# Patient Record
Sex: MALE | Race: ASIAN | URBAN - METROPOLITAN AREA
[De-identification: names, ages, dates, MRNs, and addresses within clinical notes are randomized per-mention and may not be internally consistent; named-entity substitution may affect disease eponyms.]

---

## 2018-09-06 ENCOUNTER — HISTORICAL (OUTPATIENT)
Dept: ADMINISTRATIVE | Facility: HOSPITAL | Age: 58
End: 2018-09-06

## 2018-09-11 ENCOUNTER — HISTORICAL (OUTPATIENT)
Dept: SURGERY | Facility: HOSPITAL | Age: 58
End: 2018-09-11

## 2022-04-09 ENCOUNTER — HISTORICAL (OUTPATIENT)
Dept: ADMINISTRATIVE | Facility: HOSPITAL | Age: 62
End: 2022-04-09

## 2022-04-25 VITALS — WEIGHT: 188.5 LBS | BODY MASS INDEX: 37.01 KG/M2 | HEIGHT: 60 IN

## 2022-04-30 NOTE — OP NOTE
Patient:   AMARI PIEDRA            MRN: [id number]            FIN: [id number]               Age:   57 years     Sex:  Male     :  1960   Associated Diagnoses:   None   Author:   Sidra Collins MD      Operative Note     Attending: ADINA Lopez    Date of procedure:2018    Pre-op Diagnosis:left patella fracture, closed    Post-op Diagnosis:As above    Procedure:Open reduction internal fixation left patella    Implant: Arthrex 4 mm cannulated screws x 2    Anesthesia:Gen.    Surgeon: FAWN Collins    Assistant: Harpreet Cedeño    Estimated Blood Loss: 10 ml    Indications:57-year-old male Sustained a fall on 2018 with resulting left patella fracture. the patient was in [place] at the time of injury he saw us in the clinical setting we opted to move forward with operative management to return his function and extensor mechanism.    Procedure:  After the risks, benefits, and alternatives of the procedure were discussed in detail, informed consent was obtained from the patient. The patient was taken to the operating room,  general anesthesia was administered and the patient was placed in the supine position.  The left lower extremity was prepped and draped in the usual sterile fashion.  A time out was performed: confirmation of the correct patient, operative side, operative site, Site clare, allergies and the appropriate antibiotics were administered.  A tourniquet was then inflated to 300 mm/ Hg for a total of 92 minutes of total tourniquet time.      Procedure in detail:  after positioning the patient supine on the bed with a bump under his left hip and a bone foam under his left lower extremity we marked out a standard anterior approach to the patella with a longitudinal incision centered over the patella down to just proximal to the tibial tubercle and approximately 4 cm above the superior pole of the patella.  We made a sharp incision down through skin and down to the extensor retinaculum with a 10  blade knife and dissected at this level full-thickness skin flaps.  We then incised the deep fascia and periosteum off the patella as a sleeve and elevated this exposing the fracture site.  We noted the fracture site included 1 obliquely oriented transverse fracture however the inferior aspect of the fracture there was a lateral vertical fracture line minimally displaced with a bony fragment noted this was a nonarticular piece.  We turned our attention to exposing the fracture more by making transverse incisions medial and lateral at the fracture site through minimal amount of the extensor retinaculum. we were able to digitally palpate all cartilage of the superior and inferior aspect of the fracture fragments and noted them to demonstrate normal morphology. we debrided the fracture sites with curettes and then irrigated with normal saline.  We turned our attention to placing to guidewires for cannulated screws this was placed in a antegrade fashion in the inferior aspect of the fracture fragment from the mid portion of the fragment into the distal pole.  We then reduced the fracture site and held them in place with point-to-point reduction clamps and with fluoroscopic guidance with fine tuned the position and noted to be near anatomic.  We then retrograded past our 2 guidewires and measured them for placement of the Arthrex 4 mm cannulated blunt-nose screws.  We measured 2 screws one 40 mm the medial aspect and the other 42 mm the lateral aspect. we then overdrilled our guidewires and then placed our 2 cannulated screws at the fracture site and removed our guidewires. the fracture was then noted to move as a single unit under fluoroscopic guidance happy with our reduction we turned our attention to backing up our repair by placing the Arthrex #5 FiberWire through the cannulated screws in a X pattern tying it at the inferior and lateral patella pole.  We then turned our attention to the vertically oriented fracture  at the lateral aspect of the inferior pole of the patella. we fixated this piece by placing suture fixation with another #5 FiberWire one simple loop maintaining our reduction.   we then took final x-rays AP and lateral and noted a solid repair return our attention to irrigating the wounds with normal saline and repairing the soft tissues on our way out of the incision.  We 1st repaired the extensor retinaculum over our repair using 0 Vicryl in a running manner and then turned our attention to closure of the wound with 2-0 Vicryl in the dermis followed by subcuticular 4-0 PDS and the skin.  We fashioned a sterile dressing of Dermabond followed by Steri-Strips followed by 4 x 4's and cast padding with a Ace wrap over the knee.  Finally placed a hinged knee brace locking extension. The patient tolerated the case well and at this point we ended the case and let down the tourniquet at a total of 92 minutes of tourniquet time    The patient was extubated and taken to the recovery area in stable condition having suffered no apparent untoward event from the procedure.  Complications:none  Drains:none  Plan:the patient will keep his knee immobilizer on at all times to be weightbearing as tolerated with his knee immobilizer locked in extension.  The patient will return to clinic in 2 weeks where we'll reevaluate his wounds and continue immobilization for this patient out some 8 weeks at which point we will work on increasing his range of motion 20 degrees at a time.  Dr. Lopez was present for the key component of the procedure.

## 2022-04-30 NOTE — DISCHARGE SUMMARY
Patient:   AMARI PIEDRA            MRN: [id number]            FIN: [id number]               Age:   57 years     Sex:  Male     :  1960   Associated Diagnoses:   None   Author:   Sidra Collins MD      Basic Information     Date of admission: 2018    Date of discharge: 2018    Attending physician: ALYSHA Lopez    Discharge diagnosis: left patella fracture, closed    Procedure: ORIF left patella    HPI: the patient is a 58 y/o male with left patella fracture. we indicaed him for perative management to restore his extensor mechanism.    Hospital course: The patient was admitted in same day surgery taken to the operating room with the patient underwent the procedure listed above.  The patient tolerated the procedure well please see the operative note for further details of the procedure.  Patient was deemed safe for discharge home and as such was discharged home same day as surgery in no acute distress.    Medications: See MAR  Physical exam:  AAOx3 in NAd  CV: WNL by RP  Pulm: no increased WOB  clean dry dressings to left lower extremity with hinged knee brace locked in extension    Follow-up: Patient will follow-up in 14 days for wound check    Discharge instructions: Patient is to keep the extremity clean and dry         Health Status   Allergies:    Allergies (1) Active Reaction  No Known Medication Allergies None Documented

## 2022-05-02 NOTE — HISTORICAL [facility] CERNER
This is a historical note converted from [facility]. Formatting and pictures may have been removed.  Please reference [facility] for original formatting and attached multimedia. History of Present Illness  57M s/p L patella fx. Here for ORIF L patella  Review of Systems  Constitutional: No fevers, chills or night sweats  Eye: No blurry vision  ENMT: No sore throat or cough  Respiratory:?No shortness of breath  Cardiovascular: No chest pain  Gastrointestinal:?No abdominal pain, nausea or vomiting  Genitourinary:?No dysuria  Hema/Lymph:?No abnormal bleeding or bruising  Endocrine:?No signs of hyper or hypothyroidism  Immunologic:?No rash  Musculoskeletal:?See HPI  Integumentary:?No lesions  Neurologic:?See HPI  All Other ROS:?Negative except as noted above  Physical Exam  Vitals & Measurements  HR:?96(Peripheral)? RR:?18? SpO2:?97%?  General: No acute distress  HEENT:?Normocephalic atraumatic  Cardiovascular:?Regular rate and rhythm for peripheral pulses  Respiratory: Normal work of breathing on room air  GI:?Nondistended  Psych:?Appropriate affect  Neuro:?GCS 15  ?  LLE: skin over knee intact. Ext mechanism out. SILT distally, motor function intact  Assessment/Plan  1.?Closed nondisplaced comminuted fracture of left patella  ?OR today for ORIF L patella, discharge postop  Orders:  Blood Glucose Monitoring POC, 09/11/18 5:10:00 CDT, On Admission, Stop date 09/11/18 5:10:00 CDT, if patient diabetic, 09/11/18 5:10:00 CDT  MD to Nurse, 09/11/18 5:10:00 CDT, Wil surgical site appropriately  MD to Nurse, 09/11/18 5:10:00 CDT, Pre-Op per Anesthesia  NPO, 09/11/18 5:10:00 CDT, CM NPO  Place in Outpatient Observation, 09/11/18 5:10:00 CDT, [facility], [facility], No  Prep and Clip, 09/11/18 5:10:00 CDT, Clip/Prep for surgical case  Vital Signs, 09/11/18 5:10:00 CDT, Once, Stop date 09/11/18 5:10:00 CDT, Routine  [facility] on Call, 09/11/18 5:10:00 CDT   Problem List/Past Medical History  Ongoing  Fracture of  knee-cap  Obesity  Historical  No qualifying data  Procedure/Surgical History  DENIES   Medications  Inpatient  No active inpatient medications  Home  Norco 7.5 mg-325 mg oral tablet, 1 tab(s), Oral, q6hr, PRN  Allergies  No Known Medication Allergies  Social History  Alcohol  Never, 08/22/2018  Substance Abuse  Never, 08/22/2018  Tobacco  Former smoker Use:., 08/27/2018      AMARI PIEDRA?s?medical history, pre-op exam findings left patella, diagnosis, and treatment outlined by??[facility]?has been reviewed.??Treatment plan is determined to be reasonable and appropriate.?I was present during the evaluation.  ?